# Patient Record
Sex: FEMALE | Race: OTHER | Employment: FULL TIME | URBAN - METROPOLITAN AREA
[De-identification: names, ages, dates, MRNs, and addresses within clinical notes are randomized per-mention and may not be internally consistent; named-entity substitution may affect disease eponyms.]

---

## 2018-10-30 ENCOUNTER — HOSPITAL ENCOUNTER (OUTPATIENT)
Dept: MAMMOGRAPHY | Age: 43
Discharge: HOME | End: 2018-10-30
Payer: COMMERCIAL

## 2018-10-30 DIAGNOSIS — Z12.31 SCREENING MAMMOGRAM, ENCOUNTER FOR: ICD-10-CM

## 2018-10-30 PROCEDURE — 77067 SCR MAMMO BI INCL CAD: CPT

## 2019-07-16 ENCOUNTER — APPOINTMENT (OUTPATIENT)
Dept: CT IMAGING | Age: 44
End: 2019-07-16
Attending: EMERGENCY MEDICINE
Payer: COMMERCIAL

## 2019-07-16 ENCOUNTER — HOSPITAL ENCOUNTER (EMERGENCY)
Age: 44
Discharge: HOME | End: 2019-07-16
Attending: EMERGENCY MEDICINE
Payer: COMMERCIAL

## 2019-07-16 VITALS
OXYGEN SATURATION: 100 % | BODY MASS INDEX: 25.83 KG/M2 | HEIGHT: 65 IN | TEMPERATURE: 98.9 F | RESPIRATION RATE: 16 BRPM | SYSTOLIC BLOOD PRESSURE: 112 MMHG | HEART RATE: 72 BPM | WEIGHT: 155 LBS | DIASTOLIC BLOOD PRESSURE: 66 MMHG

## 2019-07-16 DIAGNOSIS — R10.84 ABDOMINAL PAIN, GENERALIZED: Primary | ICD-10-CM

## 2019-07-16 LAB
ALBUMIN SERPL-MCNC: 4 G/DL (ref 3.5–5)
ALBUMIN/GLOB SERPL: 1 {RATIO} (ref 1.2–3.5)
ALP SERPL-CCNC: 82 U/L (ref 50–136)
ALT SERPL-CCNC: 27 U/L (ref 12–65)
ANION GAP SERPL CALC-SCNC: 3 MMOL/L (ref 7–16)
AST SERPL-CCNC: 25 U/L (ref 15–37)
BASOPHILS # BLD: 0.1 K/UL (ref 0–0.2)
BASOPHILS NFR BLD: 1 % (ref 0–2)
BILIRUB SERPL-MCNC: 0.4 MG/DL (ref 0.2–1.1)
BUN SERPL-MCNC: 7 MG/DL (ref 6–23)
CALCIUM SERPL-MCNC: 9 MG/DL (ref 8.3–10.4)
CHLORIDE SERPL-SCNC: 105 MMOL/L (ref 98–107)
CO2 SERPL-SCNC: 31 MMOL/L (ref 21–32)
CREAT SERPL-MCNC: 0.76 MG/DL (ref 0.6–1)
DIFFERENTIAL METHOD BLD: ABNORMAL
EOSINOPHIL # BLD: 0.4 K/UL (ref 0–0.8)
EOSINOPHIL NFR BLD: 4 % (ref 0.5–7.8)
ERYTHROCYTE [DISTWIDTH] IN BLOOD BY AUTOMATED COUNT: 15.8 % (ref 11.9–14.6)
GLOBULIN SER CALC-MCNC: 4 G/DL (ref 2.3–3.5)
GLUCOSE SERPL-MCNC: 89 MG/DL (ref 65–100)
HCG UR QL: NEGATIVE
HCT VFR BLD AUTO: 42.3 % (ref 35.8–46.3)
HGB BLD-MCNC: 12.7 G/DL (ref 11.7–15.4)
IMM GRANULOCYTES # BLD AUTO: 0 K/UL (ref 0–0.5)
IMM GRANULOCYTES NFR BLD AUTO: 0 % (ref 0–5)
LIPASE SERPL-CCNC: 137 U/L (ref 73–393)
LYMPHOCYTES # BLD: 2.3 K/UL (ref 0.5–4.6)
LYMPHOCYTES NFR BLD: 23 % (ref 13–44)
MCH RBC QN AUTO: 21.5 PG (ref 26.1–32.9)
MCHC RBC AUTO-ENTMCNC: 30 G/DL (ref 31.4–35)
MCV RBC AUTO: 71.7 FL (ref 79.6–97.8)
MONOCYTES # BLD: 0.7 K/UL (ref 0.1–1.3)
MONOCYTES NFR BLD: 7 % (ref 4–12)
NEUTS SEG # BLD: 6.6 K/UL (ref 1.7–8.2)
NEUTS SEG NFR BLD: 65 % (ref 43–78)
NRBC # BLD: 0 K/UL (ref 0–0.2)
PLATELET # BLD AUTO: 181 K/UL (ref 150–450)
PMV BLD AUTO: 11.4 FL (ref 9.4–12.3)
POTASSIUM SERPL-SCNC: 3.4 MMOL/L (ref 3.5–5.1)
PROT SERPL-MCNC: 8 G/DL (ref 6.3–8.2)
RBC # BLD AUTO: 5.9 M/UL (ref 4.05–5.2)
SODIUM SERPL-SCNC: 139 MMOL/L (ref 136–145)
WBC # BLD AUTO: 10 K/UL (ref 4.3–11.1)

## 2019-07-16 PROCEDURE — 81025 URINE PREGNANCY TEST: CPT

## 2019-07-16 PROCEDURE — 80053 COMPREHEN METABOLIC PANEL: CPT

## 2019-07-16 PROCEDURE — 74011250636 HC RX REV CODE- 250/636: Performed by: EMERGENCY MEDICINE

## 2019-07-16 PROCEDURE — 81003 URINALYSIS AUTO W/O SCOPE: CPT | Performed by: EMERGENCY MEDICINE

## 2019-07-16 PROCEDURE — 74011636320 HC RX REV CODE- 636/320: Performed by: EMERGENCY MEDICINE

## 2019-07-16 PROCEDURE — 96375 TX/PRO/DX INJ NEW DRUG ADDON: CPT | Performed by: EMERGENCY MEDICINE

## 2019-07-16 PROCEDURE — 74011000258 HC RX REV CODE- 258: Performed by: EMERGENCY MEDICINE

## 2019-07-16 PROCEDURE — 83690 ASSAY OF LIPASE: CPT

## 2019-07-16 PROCEDURE — 99284 EMERGENCY DEPT VISIT MOD MDM: CPT | Performed by: EMERGENCY MEDICINE

## 2019-07-16 PROCEDURE — 85025 COMPLETE CBC W/AUTO DIFF WBC: CPT

## 2019-07-16 PROCEDURE — 96374 THER/PROPH/DIAG INJ IV PUSH: CPT | Performed by: EMERGENCY MEDICINE

## 2019-07-16 PROCEDURE — 74177 CT ABD & PELVIS W/CONTRAST: CPT

## 2019-07-16 RX ORDER — HYDROCODONE BITARTRATE AND ACETAMINOPHEN 10; 325 MG/1; MG/1
1 TABLET ORAL
Qty: 15 TAB | Refills: 0 | Status: SHIPPED | OUTPATIENT
Start: 2019-07-16 | End: 2019-07-19

## 2019-07-16 RX ORDER — MORPHINE SULFATE 4 MG/ML
4 INJECTION INTRAVENOUS
Status: COMPLETED | OUTPATIENT
Start: 2019-07-16 | End: 2019-07-16

## 2019-07-16 RX ORDER — ONDANSETRON 2 MG/ML
4 INJECTION INTRAMUSCULAR; INTRAVENOUS
Status: COMPLETED | OUTPATIENT
Start: 2019-07-16 | End: 2019-07-16

## 2019-07-16 RX ORDER — SODIUM CHLORIDE 0.9 % (FLUSH) 0.9 %
10 SYRINGE (ML) INJECTION
Status: COMPLETED | OUTPATIENT
Start: 2019-07-16 | End: 2019-07-16

## 2019-07-16 RX ADMIN — ONDANSETRON 4 MG: 2 INJECTION INTRAMUSCULAR; INTRAVENOUS at 15:02

## 2019-07-16 RX ADMIN — SODIUM CHLORIDE 1000 ML: 900 INJECTION, SOLUTION INTRAVENOUS at 15:02

## 2019-07-16 RX ADMIN — Medication 10 ML: at 15:41

## 2019-07-16 RX ADMIN — MORPHINE SULFATE 4 MG: 4 INJECTION INTRAVENOUS at 15:02

## 2019-07-16 RX ADMIN — IOPAMIDOL 100 ML: 755 INJECTION, SOLUTION INTRAVENOUS at 15:41

## 2019-07-16 RX ADMIN — SODIUM CHLORIDE 100 ML: 900 INJECTION, SOLUTION INTRAVENOUS at 15:41

## 2019-07-16 NOTE — LETTER
[address] [address] EMERGENCY DEPT 
[address] [facility] [address] 
[phone] Work/School Note Date: 7/16/2019 To Whom It May concern: Neisha Sanders was seen and treated today in the emergency room by the following provider(s): 
No providers found. Neisha Tommy was seen in the emergency room 07/16/19 Sincerely, 
 
 
 
 
Jessica Bello

## 2019-07-16 NOTE — PROGRESS NOTES
present for test results with Dr. Chelsie Celestin [facility]//   Patient Filipe [address]  p: [phone] / [email or web address]

## 2019-07-16 NOTE — ED TRIAGE NOTES
[id number], Tabaré [id number] c/o lower abdominal pain that started yesterday. Pt c/o nausea, no vomiting or diarrhea. Denies difficulty going to bathroom.

## 2019-07-16 NOTE — PROGRESS NOTES
present for doctor's assessment with Dr. Jarvis Cervantes as well as for nurse assessment with Gio-BOB    Thank you,      Fransisco [address]  Irene  [phone]  [email or web address]

## 2019-07-16 NOTE — ED PROVIDER NOTES
Patient with previous appendectomy and bilateral salpingectomy. Presents with diffuse abdominal pain starting yesterday, worse today. Has nausea but no vomiting. No diarrhea or constipation. Some dysuria. The history is provided by the patient. No  was used. Abdominal Pain    This is a new problem. The current episode started yesterday. The problem occurs constantly. The problem has been gradually worsening. The pain is associated with an unknown factor. The pain is located in the generalized abdominal region. The quality of the pain is aching and sharp. The pain is moderate. Associated symptoms include nausea and dysuria. Pertinent negatives include no fever, no diarrhea, no melena, no vomiting, no constipation, no hematuria, no headaches, no chest pain and no back pain. Nothing worsens the pain. The pain is relieved by nothing. The patient's surgical history includes appendectomy. History reviewed. No pertinent past medical history.     Past Surgical History:   Procedure Laterality Date    HX APPENDECTOMY  2015    HX BILATERAL SALPINGECTOMY  2015    HX OVARIAN CYST REMOVAL Left 2001    HX OVARIAN CYST REMOVAL Right 2008    HX SALPINGO-OOPHORECTOMY           Family History:   Problem Relation Age of Onset    Hypertension Mother     Depression Mother     Elevated Lipids Mother     Heart Disease Mother     Hypertension Father     Lung Disease Father     Liver Disease Maternal Grandmother     Cancer Maternal Grandmother     Heart Disease Maternal Grandfather     Breast Cancer Neg Hx        Social History     Socioeconomic History    Marital status:      Spouse name: Not on file    Number of children: Not on file    Years of education: Not on file    Highest education level: Not on file   Occupational History    Not on file   Social Needs    Financial resource strain: Not on file    Food insecurity:     Worry: Not on file     Inability: Not on file   [address] [place] [address] Transportation needs:     Medical: Not on file     Non-medical: Not on file   Tobacco Use    Smoking status: Never Smoker    Smokeless tobacco: Never Used   Substance and Sexual Activity    Alcohol use: No     Frequency: Never    Drug use: No    Sexual activity: Yes     Partners: Male     Birth control/protection: Surgical   Lifestyle    Physical activity:     Days per week: Not on file     Minutes per session: Not on file    Stress: Not on file   Relationships    Social connections:     Talks on phone: Not on file     Gets together: Not on file     Attends U[employer] service: Not on file     Active member of club or organization: Not on file     Attends meetings of clubs or organizations: Not on file     Relationship status: Not on file    Intimate partner violence:     Fear of current or ex partner: Not on file     Emotionally abused: Not on file     Physically abused: Not on file     Forced sexual activity: Not on file   Other Topics Concern    Not on file   Social History Narrative    Not on file         ALLERGIES: Patient has no known allergies. Review of Systems   Constitutional: Negative for chills and fever. HENT: Negative for rhinorrhea and sore throat. Eyes: Negative for pain and redness. Respiratory: Negative for chest tightness, shortness of breath and wheezing. Cardiovascular: Negative for chest pain and leg swelling. Gastrointestinal: Positive for abdominal pain and nausea. Negative for constipation, diarrhea, melena and vomiting. Genitourinary: Positive for dysuria. Negative for hematuria, vaginal bleeding and vaginal discharge. Musculoskeletal: Negative for back pain, gait problem, neck pain and neck stiffness. Skin: Negative for color change and rash. Neurological: Negative for weakness, numbness and headaches. There were no vitals filed for this visit. Physical Exam   Constitutional: She is oriented to person, place, and time.  She appears well-developed and well-nourished. HENT:   Head: Normocephalic and atraumatic. Neck: Normal range of motion. Neck supple. Cardiovascular: Normal rate and regular rhythm. Pulmonary/Chest: Effort normal and breath sounds normal.   Abdominal: Soft. Bowel sounds are normal. There is tenderness (diffuse). Musculoskeletal: Normal range of motion. She exhibits no edema. Neurological: She is alert and oriented to person, place, and time. Skin: Skin is warm and dry. MDM  Number of Diagnoses or Management Options  Diagnosis management comments: Patient feeling better here. CT negative for acute cause. We'll discharge with OB/GYN follow-up. Amount and/or Complexity of Data Reviewed  Clinical lab tests: ordered and reviewed  Tests in the radiology section of CPT®: ordered and reviewed  Tests in the medicine section of CPT®: ordered and reviewed    Patient Progress  Patient progress: stable         Procedures    Results Include:    Recent Results (from the past 24 hour(s))   HCG URINE, QL. - POC    Collection Time: 07/16/19  2:30 PM   Result Value Ref Range    Pregnancy test,urine (POC) NEGATIVE  NEG     CBC WITH AUTOMATED DIFF    Collection Time: 07/16/19  2:46 PM   Result Value Ref Range    WBC 10.0 4.3 - 11.1 K/uL    RBC 5.90 (H) 4.05 - 5.2 M/uL    HGB 12.7 11.7 - 15.4 g/dL    HCT 42.3 35.8 - 46.3 %    MCV 71.7 (L) 79.6 - 97.8 FL    MCH 21.5 (L) 26.1 - 32.9 PG    MCHC 30.0 (L) 31.4 - 35.0 g/dL    RDW 15.8 (H) 11.9 - 14.6 %    PLATELET 551 505 - 738 K/uL    MPV 11.4 9.4 - 12.3 FL    ABSOLUTE NRBC 0.00 0.0 - 0.2 K/uL    DF AUTOMATED      NEUTROPHILS 65 43 - 78 %    LYMPHOCYTES 23 13 - 44 %    MONOCYTES 7 4.0 - 12.0 %    EOSINOPHILS 4 0.5 - 7.8 %    BASOPHILS 1 0.0 - 2.0 %    IMMATURE GRANULOCYTES 0 0.0 - 5.0 %    ABS. NEUTROPHILS 6.6 1.7 - 8.2 K/UL    ABS. LYMPHOCYTES 2.3 0.5 - 4.6 K/UL    ABS. MONOCYTES 0.7 0.1 - 1.3 K/UL    ABS. EOSINOPHILS 0.4 0.0 - 0.8 K/UL    ABS.  BASOPHILS 0.1 0.0 - 0.2 K/UL ABS. IMM. GRANS. 0.0 0.0 - 0.5 K/UL   METABOLIC PANEL, COMPREHENSIVE    Collection Time: 07/16/19  2:46 PM   Result Value Ref Range    Sodium 139 136 - 145 mmol/L    Potassium 3.4 (L) 3.5 - 5.1 mmol/L    Chloride 105 98 - 107 mmol/L    CO2 31 21 - 32 mmol/L    Anion gap 3 (L) 7 - 16 mmol/L    Glucose 89 65 - 100 mg/dL    BUN 7 6 - 23 MG/DL    Creatinine 0.76 0.6 - 1.0 MG/DL    GFR est AA >60 >60 ml/min/1.73m2    GFR est non-AA >60 >60 ml/min/1.73m2    Calcium 9.0 8.3 - 10.4 MG/DL    Bilirubin, total 0.4 0.2 - 1.1 MG/DL    ALT (SGPT) 27 12 - 65 U/L    AST (SGOT) 25 15 - 37 U/L    Alk. phosphatase 82 50 - 136 U/L    Protein, total 8.0 6.3 - 8.2 g/dL    Albumin 4.0 3.5 - 5.0 g/dL    Globulin 4.0 (H) 2.3 - 3.5 g/dL    A-G Ratio 1.0 (L) 1.2 - 3.5     LIPASE    Collection Time: 07/16/19  2:46 PM   Result Value Ref Range    Lipase 137 73 - 393 U/L           CT ABD PELV W CONT (Final result)   Result time 07/16/19 16:02:48   Final result by Thien Granado MD (07/16/19 16:02:48)                Impression:    IMPRESSION: No acute abdominal findings. Narrative:    CT ABDOMEN AND PELVIS WITH CONTRAST 7/16/2019    HISTORY: Lower abdominal pain beginning yesterday. Nausea. TECHNIQUE: The patient received 100 mL Isovue-370 nonionic IV contrast. Axial  images were obtained through the abdomen and pelvis. Coronal reformatted images  were generated.  All CT scans at this facility used dose modulation, interactive  reconstruction and/or weight based dosing when appropriate to reduce radiation  dose to as low as reasonably achievable. COMPARISON: None    FINDINGS: Included portions of the lung bases are clear. ABDOMEN: The gallbladder, liver, spleen, pancreas and adrenal glands are normal  in appearance. The kidneys enhance symmetrically and there is no hydronephrosis. There is no inflammation in the right lower quadrant. A moderate to large amount  of stool is present in the right hemicolon.     PELVIS: The uterus is present and is situated in the right hemipelvis. There is  no free pelvic fluid. The bladder is normal in appearance.  There are no  aggressive osseous lesions.

## 2019-07-16 NOTE — DISCHARGE INSTRUCTIONS
Patient Education        Dolor abdominal: Instrucciones de cuidado - [ Abdominal Pain: Care Instructions ]  Instrucciones de cuidado    El dolor abdominal tiene muchas causas posibles. Algunas de ellas no son graves y mejoran por sí solas en unos días. Otras requieren [place] y [place]. Si eden dolor continúa o [place], necesitará slade nueva revisión y [place] pruebas para determinar qué pasa. Es posible que necesite cirugía para corregir el problema. No ignore nuevos síntomas, omer fiebre, náuseas y [place], [facility], dolor que GERALDOMANNTRINITY o [place]. Podrían ser señales de un problema más grave. Eden médico puede haberle recomendado slade consulta de [employer] las 8 o 12 horas siguientes. Si no se siente mejor, es posible que requiera [place] o [place]. El médico lo hernandez revisado minuciosamente, tatiana puede rani problemas más tarde. Si nota algún problema o síntomas nuevos, busque tratamiento médico inmediatamente. La atención de seguimiento es slade parte clave de eden tratamiento y seguridad. Asegúrese de hacer y acudir a todas las citas, y llame a eden médico si está teniendo problemas. También es slade buena idea saber los resultados de suresh exámenes y mantener slade lista de los medicamentos que ginger. ¿Cómo puede cuidarse en el hogar? · Descanse hasta que se sienta mejor. · Para prevenir la deshidratación, oralia abundantes líquidos, suficientes para que eden orina sea de color amarillo cam o transparente omer el agua. Elija beber agua y otros líquidos salma sin cafeína hasta que se sienta mejor. Si tiene [facility], del corazón o del hígado y tiene que Goodman's líquidos, hable con eden médico antes de aumentar eden consumo. · Si tiene [employer], coma alimentos suaves, omer arroz, pan scooter seco o galletas saladas, bananas (plátanos) y puré de Synchari. Trate de comer varias comidas pequeñas al día en lugar de dos o ernie grandes.   · Espere hasta 48 horas después de que todos los síntomas hayan desaparecido antes de comer alimentos condimentados, alcohol y bebidas que contengan cafeína. · No consuma alimentos ricos en grasa. · Evite medicamentos antiinflamatorios omer aspirina, ibuprofeno (Advil, Motrin) y naproxeno (Aleve). Pueden causar [employer] [employer]. Dígale a eden médico si está tomando aspirina diariamente debido a otro problema de mabel. ¿Cuándo debe pedir ayuda? Llame al 911 en cualquier momento que considere que necesita atención de emergencia. Por ejemplo, llame si:    · Se desmayó (perdió el conocimiento).   · Las heces son de color rojizo o muy sanguinolentas (con dallin).   · Vomita dallin o algo parecido a granos de café molido.     · Tiene dolor abdominal nuevo e intenso.    Llame a eden médico ahora mismo o busque atención médica inmediata si:    · Eden dolor empeora, sobre todo si se concentra en slade marybeth parte del vientre.     · Vuelve a tener fiebre o tiene fiebre más tessa.     · Andreia heces son negruzcas y parecidas al alquitrán o tienen rastros de dallin.     · Tiene sangrado vaginal inesperado.     · Tiene síntomas de slade infección del tracto urinario. Estos podrían incluir:  ? Dolor al [place]. ? Orinar con más frecuencia que lo habitual.  ? Dallin en la [facility].     · Siente mareos o aturdimiento, o que está a punto de desmayarse.    Preste especial atención a los cambios en eden mabel y asegúrese de comunicarse con eden médico si:    · No está mejorando después de 1 día (24 horas). ¿Dónde puede encontrar más información en inglés? Merary cortes http://pita-braxton.info/. Maria Fernanda Rob K645 en la búsqueda para aprender más acerca de \"Dolor abdominal: Instrucciones de cuidado - [ Abdominal Pain: Care Instructions ]. \"  Revisado: 23 septiembre, 2018  Versión del contenido: 11.9  © 6079-4489 [employer], [employer].  Las instrucciones de cuidado fueron adaptadas bajo licencia por [employer] (which disclaims liability or warranty for this information). Si usted tiene [place] afección médica o sobre estas instrucciones, siempre pregunte a eden profesional de mabel. [facility] niega toda garantía o responsabilidad por eden uso de esta información.

## 2019-07-16 NOTE — PROGRESS NOTES
present for CT scan.      Frank Harmon [facility]//   Patient Relations & Interpreting Services  p: [phone] / [email or web address]

## 2019-07-16 NOTE — PROGRESS NOTES
present for discharge instructions. Thank you,      Mell Brooke, [address] [facility] 151 /  [email or web address] c: [phone] / [address]  [facility] 63 / Cristobal, [address] [facility]  [email or web address]

## 2019-07-16 NOTE — ED NOTES
I have reviewed discharge instructions with the patient. The patient verbalized understanding. Patient left ED via Discharge Method: ambulatory to Home with Shemar, her . Opportunity for questions and clarification provided. Patient given 1 scripts. To continue your aftercare when you leave the hospital, you may receive an automated call from our care team to check in on how you are doing. This is a free service and part of our promise to provide the best care and service to meet your aftercare needs.  If you have questions, or wish to unsubscribe from this service please call [phone]. Thank you for Choosing our [facility].

## 2019-07-17 ENCOUNTER — HOSPITAL ENCOUNTER (EMERGENCY)
Age: 44
Discharge: HOME | End: 2019-07-17
Attending: EMERGENCY MEDICINE | Admitting: EMERGENCY MEDICINE
Payer: COMMERCIAL

## 2019-07-17 VITALS
DIASTOLIC BLOOD PRESSURE: 64 MMHG | RESPIRATION RATE: 16 BRPM | BODY MASS INDEX: 25.83 KG/M2 | OXYGEN SATURATION: 99 % | HEIGHT: 65 IN | HEART RATE: 70 BPM | TEMPERATURE: 98.2 F | WEIGHT: 155 LBS | SYSTOLIC BLOOD PRESSURE: 105 MMHG

## 2019-07-17 DIAGNOSIS — R10.9 ACUTE ABDOMINAL PAIN: Primary | ICD-10-CM

## 2019-07-17 DIAGNOSIS — R11.2 NAUSEA AND VOMITING, INTRACTABILITY OF VOMITING NOT SPECIFIED, UNSPECIFIED VOMITING TYPE: ICD-10-CM

## 2019-07-17 DIAGNOSIS — N39.0 URINARY TRACT INFECTION WITHOUT HEMATURIA, SITE UNSPECIFIED: ICD-10-CM

## 2019-07-17 LAB
ALBUMIN SERPL-MCNC: 3.7 G/DL (ref 3.5–5)
ALBUMIN/GLOB SERPL: 0.9 {RATIO} (ref 1.2–3.5)
ALP SERPL-CCNC: 84 U/L (ref 50–130)
ALT SERPL-CCNC: 23 U/L (ref 12–65)
ANION GAP SERPL CALC-SCNC: 6 MMOL/L (ref 7–16)
AST SERPL-CCNC: 17 U/L (ref 15–37)
BACTERIA URNS QL MICRO: ABNORMAL /HPF
BASOPHILS # BLD: 0.1 K/UL (ref 0–0.2)
BASOPHILS NFR BLD: 1 % (ref 0–2)
BILIRUB SERPL-MCNC: 0.4 MG/DL (ref 0.2–1.1)
BUN SERPL-MCNC: 6 MG/DL (ref 6–23)
CALCIUM SERPL-MCNC: 9 MG/DL (ref 8.3–10.4)
CASTS URNS QL MICRO: 0 /LPF
CHLORIDE SERPL-SCNC: 106 MMOL/L (ref 98–107)
CO2 SERPL-SCNC: 30 MMOL/L (ref 21–32)
CREAT SERPL-MCNC: 0.76 MG/DL (ref 0.6–1)
DIFFERENTIAL METHOD BLD: ABNORMAL
EOSINOPHIL # BLD: 0.1 K/UL (ref 0–0.8)
EOSINOPHIL NFR BLD: 1 % (ref 0.5–7.8)
EPI CELLS #/AREA URNS HPF: ABNORMAL /HPF
ERYTHROCYTE [DISTWIDTH] IN BLOOD BY AUTOMATED COUNT: 15.8 % (ref 11.9–14.6)
GLOBULIN SER CALC-MCNC: 3.9 G/DL (ref 2.3–3.5)
GLUCOSE SERPL-MCNC: 97 MG/DL (ref 65–100)
HCT VFR BLD AUTO: 41.6 % (ref 35.8–46.3)
HGB BLD-MCNC: 12.6 G/DL (ref 11.7–15.4)
IMM GRANULOCYTES # BLD AUTO: 0 K/UL (ref 0–0.5)
IMM GRANULOCYTES NFR BLD AUTO: 0 % (ref 0–5)
LYMPHOCYTES # BLD: 1.4 K/UL (ref 0.5–4.6)
LYMPHOCYTES NFR BLD: 14 % (ref 13–44)
MCH RBC QN AUTO: 21.7 PG (ref 26.1–32.9)
MCHC RBC AUTO-ENTMCNC: 30.3 G/DL (ref 31.4–35)
MCV RBC AUTO: 71.6 FL (ref 79.6–97.8)
MONOCYTES # BLD: 0.6 K/UL (ref 0.1–1.3)
MONOCYTES NFR BLD: 6 % (ref 4–12)
NEUTS SEG # BLD: 7.8 K/UL (ref 1.7–8.2)
NEUTS SEG NFR BLD: 78 % (ref 43–78)
NRBC # BLD: 0 K/UL (ref 0–0.2)
PLATELET # BLD AUTO: 163 K/UL (ref 150–450)
PMV BLD AUTO: 11.6 FL (ref 9.4–12.3)
POTASSIUM SERPL-SCNC: 3.7 MMOL/L (ref 3.5–5.1)
PROT SERPL-MCNC: 7.6 G/DL (ref 6.3–8.2)
RBC # BLD AUTO: 5.81 M/UL (ref 4.05–5.2)
RBC #/AREA URNS HPF: ABNORMAL /HPF
SODIUM SERPL-SCNC: 142 MMOL/L (ref 136–145)
WBC # BLD AUTO: 10.1 K/UL (ref 4.3–11.1)
WBC URNS QL MICRO: >100 /HPF

## 2019-07-17 PROCEDURE — 74011250636 HC RX REV CODE- 250/636: Performed by: EMERGENCY MEDICINE

## 2019-07-17 PROCEDURE — 85025 COMPLETE CBC W/AUTO DIFF WBC: CPT

## 2019-07-17 PROCEDURE — 96375 TX/PRO/DX INJ NEW DRUG ADDON: CPT | Performed by: EMERGENCY MEDICINE

## 2019-07-17 PROCEDURE — 99284 EMERGENCY DEPT VISIT MOD MDM: CPT | Performed by: EMERGENCY MEDICINE

## 2019-07-17 PROCEDURE — 81015 MICROSCOPIC EXAM OF URINE: CPT

## 2019-07-17 PROCEDURE — 81003 URINALYSIS AUTO W/O SCOPE: CPT | Performed by: EMERGENCY MEDICINE

## 2019-07-17 PROCEDURE — 80053 COMPREHEN METABOLIC PANEL: CPT

## 2019-07-17 PROCEDURE — 74011000258 HC RX REV CODE- 258: Performed by: EMERGENCY MEDICINE

## 2019-07-17 PROCEDURE — 96365 THER/PROPH/DIAG IV INF INIT: CPT | Performed by: EMERGENCY MEDICINE

## 2019-07-17 RX ORDER — HYOSCYAMINE SULFATE 0.12 MG/1
0.25 TABLET SUBLINGUAL
Qty: 20 TAB | Refills: 0 | Status: SHIPPED | OUTPATIENT
Start: 2019-07-17

## 2019-07-17 RX ORDER — CIPROFLOXACIN 500 MG/1
500 TABLET ORAL 2 TIMES DAILY
Qty: 14 TAB | Refills: 0 | Status: SHIPPED | OUTPATIENT
Start: 2019-07-17 | End: 2019-08-23 | Stop reason: ALTCHOICE

## 2019-07-17 RX ORDER — ONDANSETRON 4 MG/1
4 TABLET, ORALLY DISINTEGRATING ORAL
Qty: 10 TAB | Refills: 0 | Status: SHIPPED | OUTPATIENT
Start: 2019-07-17 | End: 2019-08-23 | Stop reason: SDUPTHER

## 2019-07-17 RX ORDER — ONDANSETRON 2 MG/ML
4 INJECTION INTRAMUSCULAR; INTRAVENOUS
Status: COMPLETED | OUTPATIENT
Start: 2019-07-17 | End: 2019-07-17

## 2019-07-17 RX ADMIN — ONDANSETRON 4 MG: 2 INJECTION INTRAMUSCULAR; INTRAVENOUS at 15:48

## 2019-07-17 RX ADMIN — SODIUM CHLORIDE 1000 ML: 900 INJECTION, SOLUTION INTRAVENOUS at 15:48

## 2019-07-17 RX ADMIN — CEFTRIAXONE 1 G: 1 INJECTION, POWDER, FOR SOLUTION INTRAMUSCULAR; INTRAVENOUS at 15:48

## 2019-07-17 NOTE — ED PROVIDER NOTES
41-year-old female states 2 day history of lower abdominal pain started on the right but now more on the left suprapubically. Some dysuria. Nausea yesterday but vomiting today. No fever or chills. No diarrhea. Past history significant for appendectomy and bilateral removal of ovaries and tubes. Patient has had some problems with endometriosis and uterine fibroids per old records. Seen here yesterday. Essentially negative CT scan. Pain somewhat worse today him vomiting occurred. Also complains of dizziness and lightheadedness and some diffuse headache that occurred after starting the pain medication. Headache not associated with any stiff neck, syncope, rash, photophobia    The history is provided by the patient. A  was used. Abdominal Pain    This is a new problem. The current episode started 6 to 12 hours ago. The problem occurs constantly. The problem has not changed since onset. The pain is located in the LLQ and RLQ. The quality of the pain is dull and cramping. The pain is moderate. Associated symptoms include headaches. Pertinent negatives include no fever, no diarrhea, no vomiting, no constipation, no dysuria, no chest pain and no back pain. Nothing worsens the pain. The pain is relieved by nothing. The patient's surgical history includes appendectomy. Headache    Pertinent negatives include no fever, no palpitations, no shortness of breath and no vomiting. No past medical history on file.     Past Surgical History:   Procedure Laterality Date    HX APPENDECTOMY  2015    HX BILATERAL SALPINGECTOMY  2015    HX OVARIAN CYST REMOVAL Left 2001    HX OVARIAN CYST REMOVAL Right 2008    HX SALPINGO-OOPHORECTOMY           Family History:   Problem Relation Age of Onset    Hypertension Mother     Depression Mother     Elevated Lipids Mother     Heart Disease Mother     Hypertension Father     Lung Disease Father     Liver Disease Maternal Grandmother     Cancer Maternal Grandmother     Heart Disease Maternal Grandfather     Breast Cancer Neg Hx        Social History     Socioeconomic History    Marital status:      Spouse name: Not on file    Number of children: Not on file    Years of education: Not on file    Highest education level: Not on file   Occupational History    Not on file   Social Needs    Financial resource strain: Not on file    Food insecurity:     Worry: Not on file     Inability: Not on file    Transportation needs:     Medical: Not on file     Non-medical: Not on file   Tobacco Use    Smoking status: Never Smoker    Smokeless tobacco: Never Used   Substance and Sexual Activity    Alcohol use: No     Frequency: Never    Drug use: No    Sexual activity: Yes     Partners: Male     Birth control/protection: Surgical   Lifestyle    Physical activity:     Days per week: Not on file     Minutes per session: Not on file    Stress: Not on file   Relationships    Social connections:     Talks on phone: Not on file     Gets together: Not on file     Attends Mormon service: Not on file     Active member of club or organization: Not on file     Attends meetings of clubs or organizations: Not on file     Relationship status: Not on file    Intimate partner violence:     Fear of current or ex partner: Not on file     Emotionally abused: Not on file     Physically abused: Not on file     Forced sexual activity: Not on file   Other Topics Concern    Not on file   Social History Narrative    Not on file         ALLERGIES: Patient has no known allergies. Review of Systems   Constitutional: Negative for chills and fever. Respiratory: Negative for cough and shortness of breath. Cardiovascular: Negative for chest pain and palpitations. Gastrointestinal: Positive for abdominal pain. Negative for blood in stool, constipation, diarrhea and vomiting. Genitourinary: Negative for dysuria and flank pain.    Musculoskeletal: Negative for back pain and neck pain. Skin: Negative for color change and rash. Neurological: Positive for headaches. Negative for syncope. All other systems reviewed and are negative. Vitals:    07/17/19 1311   BP: 105/71   Pulse: 77   Resp: 16   Temp: 98.1 °F (36.7 °C)   SpO2: 100%   Weight: 70.3 kg (155 lb)   Height: 5' 5\" (1.651 m)            Physical Exam   Constitutional: She is oriented to person, place, and time. She appears well-developed and well-nourished. No distress. HENT:   Head: Normocephalic and atraumatic. Right Ear: External ear normal.   Left Ear: External ear normal.   Eyes: Pupils are equal, round, and reactive to light. Conjunctivae and EOM are normal.   Neck: Normal range of motion. Neck supple. Pulmonary/Chest: Breath sounds normal. No respiratory distress. Abdominal: Soft. Bowel sounds are normal. She exhibits no mass. There is tenderness in the left lower quadrant. There is no rebound and no guarding. No hernia. Neurological: She is alert and oriented to person, place, and time. Gait normal.   Nl speech   Skin: Skin is warm and dry. Psychiatric: She has a normal mood and affect. Her speech is normal.   Nursing note and vitals reviewed. MDM  Number of Diagnoses or Management Options  Diagnosis management comments: Repeat blood work and urinalysis. No signs of acute abdomen.        Amount and/or Complexity of Data Reviewed  Clinical lab tests: ordered and reviewed  Review and summarize past medical records: yes (ED visit yesterday with normal lab work and CAT scan.)    Risk of Complications, Morbidity, and/or Mortality  Presenting problems: moderate  Diagnostic procedures: low  Management options: moderate    Patient Progress  Patient progress: stable         Procedures      Results Include:    Recent Results (from the past 24 hour(s))   CBC WITH AUTOMATED DIFF    Collection Time: 07/17/19  2:17 PM   Result Value Ref Range    WBC 10.1 4.3 - 11.1 K/uL    RBC 5.81 (H) 4.05 - 5.2 M/uL HGB 12.6 11.7 - 15.4 g/dL    HCT 41.6 35.8 - 46.3 %    MCV 71.6 (L) 79.6 - 97.8 FL    MCH 21.7 (L) 26.1 - 32.9 PG    MCHC 30.3 (L) 31.4 - 35.0 g/dL    RDW 15.8 (H) 11.9 - 14.6 %    PLATELET 280 781 - 299 K/uL    MPV 11.6 9.4 - 12.3 FL    ABSOLUTE NRBC 0.00 0.0 - 0.2 K/uL    DF AUTOMATED      NEUTROPHILS 78 43 - 78 %    LYMPHOCYTES 14 13 - 44 %    MONOCYTES 6 4.0 - 12.0 %    EOSINOPHILS 1 0.5 - 7.8 %    BASOPHILS 1 0.0 - 2.0 %    IMMATURE GRANULOCYTES 0 0.0 - 5.0 %    ABS. NEUTROPHILS 7.8 1.7 - 8.2 K/UL    ABS. LYMPHOCYTES 1.4 0.5 - 4.6 K/UL    ABS. MONOCYTES 0.6 0.1 - 1.3 K/UL    ABS. EOSINOPHILS 0.1 0.0 - 0.8 K/UL    ABS. BASOPHILS 0.1 0.0 - 0.2 K/UL    ABS. IMM. GRANS. 0.0 0.0 - 0.5 K/UL   METABOLIC PANEL, COMPREHENSIVE    Collection Time: 07/17/19  2:17 PM   Result Value Ref Range    Sodium 142 136 - 145 mmol/L    Potassium 3.7 3.5 - 5.1 mmol/L    Chloride 106 98 - 107 mmol/L    CO2 30 21 - 32 mmol/L    Anion gap 6 (L) 7 - 16 mmol/L    Glucose 97 65 - 100 mg/dL    BUN 6 6 - 23 MG/DL    Creatinine 0.76 0.6 - 1.0 MG/DL    GFR est AA >60 >60 ml/min/1.73m2    GFR est non-AA >60 >60 ml/min/1.73m2    Calcium 9.0 8.3 - 10.4 MG/DL    Bilirubin, total 0.4 0.2 - 1.1 MG/DL    ALT (SGPT) 23 12 - 65 U/L    AST (SGOT) 17 15 - 37 U/L    Alk. phosphatase 84 50 - 130 U/L    Protein, total 7.6 6.3 - 8.2 g/dL    Albumin 3.7 3.5 - 5.0 g/dL    Globulin 3.9 (H) 2.3 - 3.5 g/dL    A-G Ratio 0.9 (L) 1.2 - 3.5     URINE MICROSCOPIC    Collection Time: 07/17/19  2:42 PM   Result Value Ref Range    WBC >100 (H) 0 /hpf    RBC 5-10 0 /hpf    Epithelial cells 0-3 0 /hpf    Bacteria 4+ (H) 0 /hpf    Casts 0 0 /lpf     Urinalysis yesterday did not show any obvious evidence for infection. Patient has obvious infection on today's lab work. We'll change the patient to a less strong pain medication and given nausea medicine as well as antibiotic.

## 2019-07-17 NOTE — LETTER
[id number] [address] EMERGENCY DEPT 
[address] [facility] [address] [phone] Work/School Note Date: 7/17/2019 To Whom It May concern: Cash Stinson was seen and treated today in the emergency room by the following provider(s): 
Attending Provider: Romana [address] [facility] [address] may return to work on 7/19. Sincerely, Brandon Sellers MD

## 2019-07-17 NOTE — PROGRESS NOTES
available on-site from 4:30 p.m. - 1:00 a.m. Please call Maurizio at [phone] with any interpreting requests. Thank you,      Maria Elena Prince, [address] [facility] 151 /  [email or web address] c: [phone] / [address] [facility] 63 / Crist[place], [address] [facility]  [email or web address]

## 2019-07-17 NOTE — ED NOTES
I have reviewed discharge instructions with the patient. The patient verbalized understanding. Patient left ED via Discharge Method: ambulatory to Home with self. Opportunity for questions and clarification provided. Patient given 3 scripts. To continue your aftercare when you leave the hospital, you may receive an automated call from our care team to check in on how you are doing. This is a free service and part of our promise to provide the best care and service to meet your aftercare needs.  If you have questions, or wish to unsubscribe from this service please call [phone]. Thank you for Choosing our [employer] [facility] Emergency Department.

## 2019-07-17 NOTE — ED TRIAGE NOTES
Pt was seen here yesterday for abdominal pain. She states she still has abdominal pain and now she has a headache, vomiting and dizziness. Pt states she was not able to go to work today.

## 2019-07-17 NOTE — DISCHARGE INSTRUCTIONS
Clear liquids if nauseated. Nausea pills if needed. Take antibiotic until gone. Pills under the tongue to try to help out with pain instead of pain pills. Recheck in 3 days if not improving. Otherwise follow-up primary care doctor to recheck urine in 2-3 weeks. Patient Education        Dolor abdominal: Instrucciones de cuidado - [ Abdominal Pain: Care Instructions ]  Instrucciones de cuidado    El dolor abdominal tiene muchas causas posibles. Algunas de ellas no son graves y mejoran por sí solas en unos días. Otras requieren Alma Bernard y [place]. Si eden dolor continúa o [place], necesitará slade nueva revisión y [place] pruebas para determinar qué pasa. Es posible que necesite cirugía para corregir el problema. No ignore nuevos síntomas, omer fiebre, náuseas y [place], [address] [facility], dolor que TIMMY o [place]. Podrían ser señales de un problema más grave. [place] médico puede haberle recomendado slade consulta de [employer] las 8 o 12 horas siguientes. Si no se siente mejor, es posible que requiera Alma Bernard o [place]. El médico lo hernandez revisado minuciosamente, tatiana puede rani problemas más tarde. Si nota algún problema o síntomas nuevos, busque tratamiento médico inmediatamente. La atención de seguimiento es slade parte clave de eden tratamiento y seguridad. Asegúrese de hacer y acudir a todas las citas, y llame a eden médico si está teniendo problemas. También es slade buena idea saber los resultados de suresh exámenes y mantener slade lista de los medicamentos que ginger. ¿Cómo puede cuidarse en el hogar? · Descanse hasta que se sienta mejor. · Para prevenir la deshidratación, oralia abundantes líquidos, suficientes para que eden orina sea de color amarillo cam o transparente omer el agua. Elija beber agua y otros líquidos salma sin cafeína hasta que se sienta mejor.  Si tiene [facility] [employer], del corazón o del hígado y tiene que [employer] líquidos, hable con eden médico antes de aumentar eden consumo. · Si tiene [employer], coma alimentos suaves, omer arroz, pan scooter seco o galletas saladas, bananas (plátanos) y puré de Synchari. Trate de comer varias comidas pequeñas al día en lugar de dos o ernie grandes. · Espere hasta 48 horas después de que todos los síntomas hayan desaparecido antes de comer alimentos condimentados, alcohol y bebidas que contengan cafeína. · No consuma alimentos ricos en grasa. · Evite medicamentos antiinflamatorios omer aspirina, ibuprofeno (Advil, Motrin) y naproxeno (Aleve). Pueden causar Sumer[place]ySugr. Dígale a eden médico si está tomando aspirina diariamente debido a otro problema de mabel. ¿Cuándo debe pedir ayuda? Llame al 911 en cualquier momento que considere que necesita atención de emergencia. Por ejemplo, llame si:    · Se desmayó (perdió el conocimiento).   · Las heces son de color rojizo o muy sanguinolentas (con dallin).   · Vomita dallin o algo parecido a granos de café molido.     · Tiene dolor abdominal nuevo e intenso.    Llame a eden médico ahora mismo o busque atención médica inmediata si:    · [place] dolor empeora, sobre todo si se concentra en slade marybeth parte del vientre.     · Vuelve a tener fiebre o tiene fiebre más tessa.     · Andreia heces son negruzcas y parecidas al alquitrán o tienen rastros de dallin.     · Tiene sangrado vaginal inesperado.     · Tiene síntomas de slade infección del tracto urinario. Estos podrían incluir:  ? Dolor al Jacqualin Morgans. ? Orinar con más frecuencia que lo habitual.  ? Dallin en la [facility].     · Siente mareos o aturdimiento, o que está a punto de desmayarse.    Preste especial atención a los cambios en eden mabel y asegúrese de comunicarse con eden médico si:    · No está mejorando después de 1 día (24 horas). ¿Dónde puede encontrar más información en inglés? Christa Kin a [email or web address]   Larissa Allen [id number] en la búsqueda para aprender más acerca de \"Dolor abdominal: Instrucciones de cuidado - [ Abdominal Pain: Care Instructions ]. \"  Revisado: 23 septiembre, 2018  Versión del contenido: 11.9  © 9071-2943 Healthwise, Incorporated. Las instrucciones de cuidado fueron adaptadas bajo licencia por [employer] (which disclaims liability or warranty for this information). Si usted tiene [place] afección médica o sobre estas instrucciones, siempre pregunte a eden profesional de mabel. Healthwise, Incorporated niega toda garantía o responsabilidad por eden uso de esta información. Patient Education        Reid Chavira en las mujeres: Instrucciones de cuidado - [ Urinary Tract Infection in Women: Care Instructions ]  Instrucciones de cuidado    Slade infección urinaria (UTI, por suresh siglas en inglés) es un término general que hace referencia a slade infección que se produce en cualquier parte entre los riñones y la uretra (conducto por el cual se expulsa la orina). La mayoría de las UTI son infecciones de la vejiga. Con frecuencia, causan dolor o ardor al [place]. Las UTI son causadas por bacterias y pueden curarse con antibióticos. Asegúrese de completar el tratamiento para que la infección desaparezca. La atención de seguimiento es slade parte clave de eden tratamiento y seguridad. Asegúrese de hacer y acudir a todas las citas, y llame a eden médico si está teniendo problemas. También es slade buena idea saber los resultados de suresh exámenes y mantener slade lista de los medicamentos que ginger. ¿Cómo puede cuidarse en el hogar? · [address] No deje de tomarlos por el hecho de sentirse mejor. Debe julissa todos los antibióticos hasta terminarlos. · Ashish los próximos ciara o [address], oralia mayor cantidad de [place] y otros líquidos. [place] puede ayudar a eliminar las bacterias que provocan la infección.  (Si tiene slade enfermedad de los riñones, el corazón o el hígado y tiene que Lalitha's líquidos, hable con eden médico antes de aumentar eden consumo). · Evite las bebidas gaseosas o con cafeína. Pueden irritar la vejiga. · Orine con frecuencia. Trate de vaciar la vejiga cada vez que orine. · Para aliviar el dolor, tome un baño caliente o colóquese slade almohadilla térmica a baja temperatura sobre la parte baja del abdomen o la joselito genital. Nunca se duerma mientras [place] slade almohadilla térmica. Para prevenir las infecciones urinarias  · Linda abundante agua todos los días. [place] la ayuda a orinar con frecuencia, lo que elimina las bacterias de eden organismo. (Si tiene slade enfermedad de los riñones, el corazón o el hígado y tiene que Lalitha's líquidos, hable con eden médico antes de aumentar eden consumo). · Orine cuando necesite hacerlo. · Orine inmediatamente después de rani tenido [employer]. · Cámbiese las toallas sanitarias con frecuencia. · Evite el uso de lavados vaginales, los emmy de burbujas, los [place] de higiene femenina y otros productos para la higiene femenina que contengan desodorantes. · Después de ir al baño, límpiese de adelante hacia atrás. ¿Cuándo debes pedir ayuda? Llama a tu médico ahora mismo o busca atención médica inmediata si:    · Aparecen síntomas omer fiebre, escalofríos, náuseas o vómitos por primera vez, o empeoran.     · Te empieza a doler la espalda, ronaldo debajo de la caja torácica. A esto se le llama dolor en el flanco.     · Aparece dallin o pus en la orina.     · Tienes problemas con los antibióticos.    Presta especial atención a los cambios en tu mabel y asegúrate de comunicarte con tu médico si:    · No mejoras después de rani tomado un antibiótico agnes 2 días.     · Los síntomas desaparecen y [place]. ¿Dónde puede encontrar más información en inglés? Ricarda Hernandez a [email or web address]   [place] [id number] en la búsqueda para aprender más acerca de \"Infección urinaria en las mujeres: Instrucciones de cuidado - [ Urinary Tract Infection in Women: Care Instructions ].\"  Revisado: 20 marzo, 2018  Versión del contenido: 11.9  © 6439-0181 Healthwise, Incorporated. Las instrucciones de cuidado fueron adaptadas bajo licencia por [employer] (which disclaims liability or warranty for this information). Si usted tiene [place] afección médica o sobre estas instrucciones, siempre pregunte a eden profesional de mabel. Healthwise, Incorporated niega toda garantía o responsabilidad por eden uso de esta información.

## 2019-08-23 PROBLEM — L74.8 AXILLARY ODOR: Status: ACTIVE | Noted: 2018-03-03

## 2019-08-23 PROBLEM — Z00.00 HEALTHCARE MAINTENANCE: Status: ACTIVE | Noted: 2018-03-03

## 2019-08-23 PROBLEM — Z59.89 DOES NOT HAVE HEALTH INSURANCE: Status: ACTIVE | Noted: 2018-03-03

## 2019-08-23 PROBLEM — R19.00 PELVIC MASS IN FEMALE: Status: ACTIVE | Noted: 2018-03-03

## 2019-08-23 PROBLEM — E89.40 SURGICAL MENOPAUSE: Status: ACTIVE | Noted: 2018-03-03
